# Patient Record
Sex: MALE | URBAN - METROPOLITAN AREA
[De-identification: names, ages, dates, MRNs, and addresses within clinical notes are randomized per-mention and may not be internally consistent; named-entity substitution may affect disease eponyms.]

---

## 2024-02-27 ENCOUNTER — TELEPHONE (OUTPATIENT)
Dept: UROLOGY | Facility: MEDICAL CENTER | Age: 54
End: 2024-02-27

## 2024-02-27 DIAGNOSIS — Z12.5 SPECIAL SCREENING EXAMINATION FOR NEOPLASM OF PROSTATE: Primary | ICD-10-CM

## 2024-02-27 RX ORDER — LANCETS 33 GAUGE
1 EACH MISCELLANEOUS DAILY
COMMUNITY
Start: 2023-10-16

## 2024-02-27 RX ORDER — METHOCARBAMOL 500 MG/1
500 TABLET, FILM COATED ORAL DAILY PRN
COMMUNITY
Start: 2024-01-12

## 2024-02-27 RX ORDER — SUMATRIPTAN AND NAPROXEN SODIUM 85; 500 MG/1; MG/1
1 TABLET, FILM COATED ORAL EVERY 2 HOUR PRN
COMMUNITY
Start: 2024-01-12

## 2024-02-27 RX ORDER — NAPROXEN 500 MG/1
500 TABLET ORAL 2 TIMES DAILY WITH MEALS
COMMUNITY
Start: 2024-01-12 | End: 2025-01-11

## 2024-02-27 RX ORDER — LOSARTAN POTASSIUM 100 MG/1
100 TABLET ORAL DAILY
COMMUNITY
Start: 2023-10-16

## 2024-02-27 RX ORDER — GLIMEPIRIDE 4 MG/1
1 TABLET ORAL DAILY
COMMUNITY
Start: 2023-12-22

## 2024-02-27 RX ORDER — SILDENAFIL 100 MG/1
100 TABLET, FILM COATED ORAL AS NEEDED
COMMUNITY
Start: 2023-10-16

## 2024-02-27 RX ORDER — OMEPRAZOLE 20 MG/1
20 CAPSULE, DELAYED RELEASE ORAL DAILY
COMMUNITY

## 2024-02-27 NOTE — TELEPHONE ENCOUNTER
Call placed to Pt and left message on his voicemail that a PSA is placed in [facility] and for him to go have this done at one of our Labs. Pt appointment is schedule for 03/08.

## 2024-03-07 ENCOUNTER — APPOINTMENT (OUTPATIENT)
Dept: LAB | Facility: CLINIC | Age: 54
End: 2024-03-07
Payer: COMMERCIAL

## 2024-03-07 LAB — PSA SERPL-MCNC: 0.69 NG/ML (ref 0–4)

## 2024-03-07 PROCEDURE — 84153 ASSAY OF PSA TOTAL: CPT

## 2024-03-07 PROCEDURE — 36415 COLL VENOUS BLD VENIPUNCTURE: CPT

## 2024-05-30 ENCOUNTER — OFFICE VISIT (OUTPATIENT)
Dept: UROLOGY | Facility: MEDICAL CENTER | Age: 54
End: 2024-05-30
Payer: COMMERCIAL

## 2024-05-30 VITALS — HEART RATE: 77 BPM | DIASTOLIC BLOOD PRESSURE: 86 MMHG | WEIGHT: 215 LBS | SYSTOLIC BLOOD PRESSURE: 140 MMHG

## 2024-05-30 DIAGNOSIS — Z12.11 ENCOUNTER FOR SCREENING COLONOSCOPY: ICD-10-CM

## 2024-05-30 DIAGNOSIS — R35.0 BENIGN PROSTATIC HYPERPLASIA WITH URINARY FREQUENCY: Primary | ICD-10-CM

## 2024-05-30 DIAGNOSIS — N52.1 ERECTILE DYSFUNCTION DUE TO DISEASES CLASSIFIED ELSEWHERE: ICD-10-CM

## 2024-05-30 DIAGNOSIS — E11.9 DIABETES MELLITUS TYPE 2 IN NONOBESE (HCC): ICD-10-CM

## 2024-05-30 DIAGNOSIS — N40.1 BENIGN PROSTATIC HYPERPLASIA WITH URINARY FREQUENCY: Primary | ICD-10-CM

## 2024-05-30 DIAGNOSIS — Z80.42 FAMILY HISTORY OF PROSTATE CANCER IN FATHER: ICD-10-CM

## 2024-05-30 PROCEDURE — 99204 OFFICE O/P NEW MOD 45 MIN: CPT | Performed by: UROLOGY

## 2024-05-30 RX ORDER — INSULIN GLARGINE 100 [IU]/ML
INJECTION, SOLUTION SUBCUTANEOUS
COMMUNITY

## 2024-05-30 NOTE — ASSESSMENT & PLAN NOTE
HELADIO is palpably benign.  PSA was normal at 0.69 in March 2024.  Continue yearly check of both PSA and HELADIO.

## 2024-05-30 NOTE — ASSESSMENT & PLAN NOTE
Secondary to diabetes.  Testosterone was normal last year.  He is presently doing well with Trimix.  He will call for refill when he needs his prescription refilled.

## 2024-05-30 NOTE — PROGRESS NOTES
Ambulatory Visit  Name: Estrada Brooks      : 1970      MRN: [id number]  Encounter Provider: Trenton Ortiz MD  Encounter Date: 2024   Encounter department: [facility] [place]    Assessment & Plan   1. Benign prostatic hyperplasia with urinary frequency  Assessment & Plan:  AUA symptom score is 12.  Primary symptom is urinary frequency and he does drink a lot of fluids.  Likely this is being contributed to by Jardiance use as well.  We will continue to follow with watchful waiting.  Orders:  -     PSA Total, Diagnostic; Future; Expected date: 2025  -     Urinalysis with microscopic; Future; Expected date: 2025  2. Erectile dysfunction due to diseases classified elsewhere  Assessment & Plan:  Secondary to diabetes.  Testosterone was normal last year.  He is presently doing well with Trimix.  He will call for refill when he needs his prescription refilled.  Orders:  -     Testosterone; Future; Expected date: 2025  3. Family history of prostate cancer in father  Assessment & Plan:  HELADIO is palpably benign.  PSA was normal at 0.69 in 2024.  Continue yearly check of both PSA and HELADIO.  Orders:  -     PSA Total, Diagnostic; Future; Expected date: 2025  4. Diabetes mellitus type 2 in nonobese (HCC)  -     Urinalysis with microscopic; Future; Expected date: 2025  -     Testosterone; Future; Expected date: 2025  5. Encounter for screening colonoscopy  -     Ambulatory Referral to Gastroenterology; Future      History of Present Illness     Estrada Brooks is a 54 y.o. male who presents as a new patient for evaluation.  He reports a strong family history of prostate cancer and a father and grandfather.  At the present time he notes he voids with an adequate stream and feels he empties his bladder.  He does void frequently but notes he drinks a lot of fluids.  He does get up 3 times at night to urinate.  There is no gross hematuria or symptoms of  infection.  He has no incontinence.  He is diabetic and takes Jardiance.  He has a 2 to 3-year history of erectile dysfunction.  He failed oral agents and is presently using Trimix.    Review of Systems   Constitutional:  Negative for chills, diaphoresis, fatigue and fever.   HENT: Negative.     Eyes: Negative.    Respiratory: Negative.     Cardiovascular: Negative.    Endocrine: Negative.    Genitourinary:         See HPI   Musculoskeletal: Negative.    Skin: Negative.    Allergic/Immunologic: Negative.    Neurological: Negative.    Hematological: Negative.    Psychiatric/Behavioral: Negative.         AUA SYMPTOM SCORE      Flowsheet Row Most Recent Value   AUA SYMPTOM SCORE    How often have you had a sensation of not emptying your bladder completely after you finished urinating? 0 (P)     How often have you had to urinate again less than two hours after you finished urinating? 3 (P)     How often have you found you stopped and started again several times when you urinate? 0 (P)     How often have you found it difficult to postpone urination? 3 (P)     How often have you had a weak urinary stream? 3 (P)     How often have you had to push or strain to begin urination? 0 (P)     How many times did you most typically get up to urinate from the time you went to bed at night until the time you got up in the morning? 3 (P)     Quality of Life: If you were to spend the rest of your life with your urinary condition just the way it is now, how would you feel about that? 3 (P)     AUA SYMPTOM SCORE 12 (P)            Objective     /86   Pulse 77   Wt 97.5 kg (215 lb)   Physical Exam  Vitals reviewed.   Constitutional:       General: He is not in acute distress.     Appearance: He is well-developed. He is not ill-appearing, toxic-appearing or diaphoretic.   HENT:      Head: Normocephalic and atraumatic.   Eyes:      General: No scleral icterus.     Conjunctiva/sclera: Conjunctivae normal.   Cardiovascular:      Rate  "and Rhythm: Normal rate.   Pulmonary:      Effort: Pulmonary effort is normal.   Abdominal:      General: Bowel sounds are normal. There is no distension.      Palpations: Abdomen is soft. There is no mass.      Tenderness: There is no abdominal tenderness. There is no right CVA tenderness, left CVA tenderness, guarding or rebound.      Hernia: No hernia is present.   Genitourinary:     Penis: Normal. No phimosis or hypospadias.       Testes: Normal.         Right: Mass not present.         Left: Mass not present.      Rectum: Normal.      Comments: Prostate 1+ enlarged and palpably benign  Musculoskeletal:         General: Normal range of motion.      Cervical back: Neck supple.   Skin:     General: Skin is warm and dry.   Neurological:      General: No focal deficit present.      Mental Status: He is alert and oriented to person, place, and time.   Psychiatric:         Mood and Affect: Mood normal.         Behavior: Behavior normal.         Thought Content: Thought content normal.         Judgment: Judgment normal.       Results  Lab Results   Component Value Date    PSA 0.69 03/07/2024     Lab Results   Component Value Date    CALCIUM 9.8 02/19/2024    K 4.8 02/19/2024    CO2 27 02/19/2024     02/19/2024    BUN 27 02/19/2024    CREATININE 1.30 02/19/2024     No results found for: \"WBC\", \"HGB\", \"HCT\", \"MCV\", \"PLT\"    Office Urine Dip  No results found for this or any previous visit (from the past 1 hour(s)).]    Administrative Statements           "

## 2024-05-30 NOTE — ASSESSMENT & PLAN NOTE
AUA symptom score is 12.  Primary symptom is urinary frequency and he does drink a lot of fluids.  Likely this is being contributed to by Jardiance use as well.  We will continue to follow with watchful waiting.

## 2025-08-18 ENCOUNTER — APPOINTMENT (OUTPATIENT)
Dept: RADIOLOGY | Facility: MEDICAL CENTER | Age: 55
End: 2025-08-18
Attending: PHYSICIAN ASSISTANT
Payer: OTHER MISCELLANEOUS

## 2025-08-18 ENCOUNTER — OCCMED (OUTPATIENT)
Dept: URGENT CARE | Facility: MEDICAL CENTER | Age: 55
End: 2025-08-18
Payer: OTHER MISCELLANEOUS
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
## 2025-08-18 PROCEDURE — 99283 EMERGENCY DEPT VISIT LOW MDM: CPT | Performed by: PHYSICIAN ASSISTANT

## 2025-08-18 PROCEDURE — G0382 LEV 3 HOSP TYPE B ED VISIT: HCPCS | Performed by: PHYSICIAN ASSISTANT

## 2025-08-18 PROCEDURE — 73080 X-RAY EXAM OF ELBOW: CPT
